# Patient Record
Sex: FEMALE | Race: WHITE | NOT HISPANIC OR LATINO | URBAN - METROPOLITAN AREA
[De-identification: names, ages, dates, MRNs, and addresses within clinical notes are randomized per-mention and may not be internally consistent; named-entity substitution may affect disease eponyms.]

---

## 2018-03-01 ENCOUNTER — OUTPATIENT (OUTPATIENT)
Dept: OUTPATIENT SERVICES | Facility: HOSPITAL | Age: 18
LOS: 1 days | Discharge: HOME | End: 2018-03-01

## 2018-03-01 PROBLEM — Z00.00 ENCOUNTER FOR PREVENTIVE HEALTH EXAMINATION: Status: ACTIVE | Noted: 2018-03-01

## 2018-03-02 DIAGNOSIS — F43.21 ADJUSTMENT DISORDER WITH DEPRESSED MOOD: ICD-10-CM

## 2018-05-24 ENCOUNTER — EMERGENCY (EMERGENCY)
Facility: HOSPITAL | Age: 18
LOS: 1 days | Discharge: HOME | End: 2018-05-24
Attending: EMERGENCY MEDICINE | Admitting: EMERGENCY MEDICINE
Payer: MEDICAID

## 2018-05-24 VITALS
TEMPERATURE: 98 F | HEART RATE: 73 BPM | OXYGEN SATURATION: 100 % | DIASTOLIC BLOOD PRESSURE: 58 MMHG | SYSTOLIC BLOOD PRESSURE: 124 MMHG | RESPIRATION RATE: 16 BRPM

## 2018-05-24 PROCEDURE — 99283 EMERGENCY DEPT VISIT LOW MDM: CPT

## 2018-05-24 PROCEDURE — 73130 X-RAY EXAM OF HAND: CPT | Mod: 26,LT

## 2018-05-24 NOTE — ED PROVIDER NOTE - OBJECTIVE STATEMENT
18 y/o F w/ no significant PMhx, presents tot he ED c/o left thumb pain s/p trip and fall last night. Pt states she was walking, tripped and fell on outstretched left hand. Pt notes she felt her thumb bend backwards during the fall. Denies LOC, head trauma, numbness/tingling or any other complaints. NKDA.

## 2019-11-15 ENCOUNTER — OUTPATIENT (OUTPATIENT)
Dept: OUTPATIENT SERVICES | Facility: HOSPITAL | Age: 19
LOS: 1 days | Discharge: HOME | End: 2019-11-15

## 2019-11-18 DIAGNOSIS — F43.20 ADJUSTMENT DISORDER, UNSPECIFIED: ICD-10-CM

## 2020-08-19 ENCOUNTER — APPOINTMENT (OUTPATIENT)
Dept: CARDIOLOGY | Facility: CLINIC | Age: 20
End: 2020-08-19
Payer: MEDICAID

## 2020-08-19 VITALS
HEIGHT: 69 IN | SYSTOLIC BLOOD PRESSURE: 114 MMHG | BODY MASS INDEX: 25.77 KG/M2 | DIASTOLIC BLOOD PRESSURE: 66 MMHG | WEIGHT: 174 LBS | HEART RATE: 91 BPM | OXYGEN SATURATION: 98 %

## 2020-08-19 DIAGNOSIS — I87.2 VENOUS INSUFFICIENCY (CHRONIC) (PERIPHERAL): ICD-10-CM

## 2020-08-19 PROCEDURE — 99204 OFFICE O/P NEW MOD 45 MIN: CPT

## 2020-08-19 NOTE — ASSESSMENT
[FreeTextEntry1] : Assessment:\par 1.  Varicose veins\par 2.  Deep venous reflux based on duplex\par \par \par Plan:\par 1.  Repeat venous reflux study\par 2.  Will perform MR venogram to assess for central venous compression \par 3.  Transition to knee high 20-30 mmhg compression garments\par 4.  She should talk to her OB/GYN to seek an alternative to an estrogen containing OCP (she may be at higher risk fo VTE events)\par 5.  Await studies, then telephone conference afterwards\par

## 2020-08-19 NOTE — PHYSICAL EXAM
[Well Groomed] : well groomed [Normal Appearance] : normal appearance [General Appearance - Well Developed] : well developed [No Deformities] : no deformities [General Appearance - Well Nourished] : well nourished [General Appearance - In No Acute Distress] : no acute distress [Normal Conjunctiva] : the conjunctiva exhibited no abnormalities [Eyelids - No Xanthelasma] : the eyelids demonstrated no xanthelasmas [Normal Jugular Venous V Waves Present] : normal jugular venous V waves present [Normal Jugular Venous A Waves Present] : normal jugular venous A waves present [Heart Sounds] : normal S1 and S2 [No Jugular Venous Valdez A Waves] : no jugular venous valdez A waves [Heart Rate And Rhythm] : heart rate and rhythm were normal [Murmurs] : no murmurs present [Abdomen Soft] : soft [Abdomen Mass (___ Cm)] : no abdominal mass palpated [Abdomen Tenderness] : non-tender [] : no hepato-splenomegaly [Gait - Sufficient For Exercise Testing] : the gait was sufficient for exercise testing [Abnormal Walk] : normal gait [FreeTextEntry1] : Strong pedal pulses.  R calf VV, compressible, soft.  No edema.  No hyperpigmentation.

## 2020-08-19 NOTE — REASON FOR VISIT
[Initial Evaluation] : an initial evaluation of [FreeTextEntry2] : VV [FreeTextEntry1] : Here for second opinion \par Was seen at [facility] - \par Had reflux study \par was told she has Deep venous reflux (based on duplex)\par No MR venogram was performed\par Reccomended bilateral stenting \par \par She has a small, right calf VV on exam.  The vein is painful and worse with prolonged standing.  She has 15-20 Mmhg comression garments pantyhose that she wears when is not hot\par \par Went to [place]\par Now works as a  standing long hours - worse with prolonged standing\par The left leg is ok\par \par Joined with her mother\par She is on Blisovife - an estrogen containing OCP\par \par Maternal Aunt and Maternal GM with VV\par She feels like the vein is going to burst sometimes.  No prior vascular procedures.  No claudication.  Symptoms for the past 2 years.

## 2020-08-26 ENCOUNTER — APPOINTMENT (OUTPATIENT)
Dept: VASCULAR SURGERY | Facility: CLINIC | Age: 20
End: 2020-08-26
Payer: MEDICAID

## 2020-08-26 VITALS — TEMPERATURE: 97.5 F

## 2020-08-26 PROCEDURE — 93970 EXTREMITY STUDY: CPT

## 2020-09-11 ENCOUNTER — TRANSCRIPTION ENCOUNTER (OUTPATIENT)
Age: 20
End: 2020-09-11

## 2020-09-30 ENCOUNTER — APPOINTMENT (OUTPATIENT)
Dept: CARDIOLOGY | Facility: CLINIC | Age: 20
End: 2020-09-30
Payer: MEDICAID

## 2020-09-30 VITALS
TEMPERATURE: 98.7 F | HEIGHT: 69 IN | OXYGEN SATURATION: 98 % | HEART RATE: 80 BPM | SYSTOLIC BLOOD PRESSURE: 112 MMHG | BODY MASS INDEX: 24.92 KG/M2 | DIASTOLIC BLOOD PRESSURE: 69 MMHG | WEIGHT: 168.25 LBS

## 2020-09-30 PROCEDURE — 99213 OFFICE O/P EST LOW 20 MIN: CPT

## 2020-09-30 NOTE — ASSESSMENT
[FreeTextEntry1] : Assessment:\par 1.  Varicose veins\par 2.  R GSV reflux\par \par \par Plan:\par 1. Continue with 20-30 knee high compression\par 2.  Referral to Dr. Muir for EVLT of the R GSV

## 2020-09-30 NOTE — PHYSICAL EXAM
[General Appearance - Well Developed] : well developed [Normal Appearance] : normal appearance [Well Groomed] : well groomed [General Appearance - Well Nourished] : well nourished [No Deformities] : no deformities [General Appearance - In No Acute Distress] : no acute distress [Normal Conjunctiva] : the conjunctiva exhibited no abnormalities [Eyelids - No Xanthelasma] : the eyelids demonstrated no xanthelasmas [Normal Jugular Venous A Waves Present] : normal jugular venous A waves present [Normal Jugular Venous V Waves Present] : normal jugular venous V waves present [No Jugular Venous Valdez A Waves] : no jugular venous valdez A waves [Heart Rate And Rhythm] : heart rate and rhythm were normal [Heart Sounds] : normal S1 and S2 [Murmurs] : no murmurs present [Abdomen Soft] : soft [Abdomen Tenderness] : non-tender [] : no hepato-splenomegaly [Abdomen Mass (___ Cm)] : no abdominal mass palpated [Abnormal Walk] : normal gait [Gait - Sufficient For Exercise Testing] : the gait was sufficient for exercise testing [FreeTextEntry1] : Strong pedal pulses.  R calf VV, compressible, soft.  No edema.  No hyperpigmentation.

## 2020-09-30 NOTE — REASON FOR VISIT
[Follow-Up - Clinic] : a clinic follow-up of [FreeTextEntry2] : VV [FreeTextEntry1] :  No may thurners on reflux study \par still having symptoms\par has 20-30 mmHg compression garments\par pain and aching with prolonged standing\par still on esttrogen OCP, will switch next month\par

## 2020-12-02 ENCOUNTER — APPOINTMENT (OUTPATIENT)
Dept: VASCULAR SURGERY | Facility: CLINIC | Age: 20
End: 2020-12-02
Payer: MEDICAID

## 2020-12-02 VITALS
DIASTOLIC BLOOD PRESSURE: 58 MMHG | BODY MASS INDEX: 24.25 KG/M2 | SYSTOLIC BLOOD PRESSURE: 120 MMHG | HEIGHT: 68 IN | HEART RATE: 71 BPM | WEIGHT: 160 LBS

## 2020-12-02 VITALS — TEMPERATURE: 95.9 F

## 2020-12-02 DIAGNOSIS — Z78.9 OTHER SPECIFIED HEALTH STATUS: ICD-10-CM

## 2020-12-02 PROCEDURE — 99072 ADDL SUPL MATRL&STAF TM PHE: CPT

## 2020-12-02 PROCEDURE — 99202 OFFICE O/P NEW SF 15 MIN: CPT

## 2020-12-02 RX ORDER — NORETHINDRONE ACETATE AND ETHINYL ESTRADIOL 1.5-30(21)
KIT ORAL
Refills: 0 | Status: ACTIVE | COMMUNITY

## 2020-12-02 NOTE — PHYSICAL EXAM
[2+] : left 2+ [Varicose Veins Of Lower Extremities] : present [Varicose Veins Of The Right Leg] : of the right leg [Ankle Swelling On The Left] : moderate [No Rash or Lesion] : No rash or lesion [Alert] : alert [Calm] : calm [JVD] : no jugular venous distention  [Ankle Swelling (On Exam)] : not present [] : not present [Skin Ulcer] : no ulcer [de-identified] : appears well  [de-identified] : motor intact b/l lower extremities, muscle strength 5/5 with dorsi and plantar flexion\par  [de-identified] : sensation intact b/l lower extremities

## 2020-12-02 NOTE — CONSULT LETTER
[Dear  ___] : Dear  [unfilled], [Consult Letter:] : I had the pleasure of evaluating your patient, [unfilled]. [Please see my note below.] : Please see my note below. [Sincerely,] : Sincerely, [FreeTextEntry3] : Patti Reddy PA-C\par [facility]\par

## 2020-12-02 NOTE — HISTORY OF PRESENT ILLNESS
[FreeTextEntry1] : 19 yo female with a history of varicose veins of the right lower extremity presents for evaluation of painful varicose veins.  pt states that the veins have been present for many years but they have become larger and more painful over time.  pt works as a  and has been wearing compression stockings without any relief.  pt rates the pain 8/10 and described as a gnawing pain.  pt states that she tries to message her leg without any relief.  the symptoms are interfering with her daily life and slowing her down at work.

## 2020-12-02 NOTE — ASSESSMENT
[FreeTextEntry1] : 21 yo female with a history of varicose veins of the right lower extremity presents for evaluation of painful varicose veins\par \par duplex reviewed and shows insufficiency in the right gsv \par given pain without any relief with compression stockings will arrange for rf ablation of the right lower extremity \par

## 2020-12-15 ENCOUNTER — APPOINTMENT (OUTPATIENT)
Dept: VASCULAR SURGERY | Facility: CLINIC | Age: 20
End: 2020-12-15
Payer: MEDICAID

## 2020-12-15 VITALS — TEMPERATURE: 96.8 F

## 2020-12-15 PROCEDURE — 99213 OFFICE O/P EST LOW 20 MIN: CPT

## 2020-12-15 PROCEDURE — 99072 ADDL SUPL MATRL&STAF TM PHE: CPT

## 2020-12-15 NOTE — PHYSICAL EXAM
[Varicose Veins Of Lower Extremities] : present [Varicose Veins Of The Right Leg] : of the right leg [Ankle Swelling On The Left] : moderate [No Rash or Lesion] : No rash or lesion [Alert] : alert [Calm] : calm [JVD] : no jugular venous distention  [Ankle Swelling (On Exam)] : not present [] : not present [Skin Ulcer] : no ulcer [de-identified] : appears well  [de-identified] : \par  [de-identified] : sensation intact b/l lower extremities

## 2020-12-15 NOTE — ASSESSMENT
[FreeTextEntry1] : 19 yo female with a history of varicose veins of the right lower extremity presents for evaluation of painful varicose veins\par \par duplex reviewed and shows insufficiency in the right gsv \par given pain without any relief with compression stockings will arrange for rf ablation of the right lower extremity

## 2020-12-15 NOTE — HISTORY OF PRESENT ILLNESS
[FreeTextEntry1] : 19 yo female with a history of varicose veins of the right lower extremity presents for follow up of painful varicose veins.  pt states that the veins have been present for many years but they have become larger and more painful over time.  pt works as a  and has been wearing compression stockings without any relief.  pt rates the pain 8/10 and described as a gnawing pain.  pt states that she tries to message her leg without any relief.  the symptoms are interfering with her daily life and slowing her down at work.

## 2021-02-23 ENCOUNTER — RESULT REVIEW (OUTPATIENT)
Age: 21
End: 2021-02-23

## 2021-03-27 ENCOUNTER — APPOINTMENT (OUTPATIENT)
Dept: DISASTER EMERGENCY | Facility: CLINIC | Age: 21
End: 2021-03-27

## 2021-03-27 DIAGNOSIS — Z01.818 ENCOUNTER FOR OTHER PREPROCEDURAL EXAMINATION: ICD-10-CM

## 2021-03-27 LAB — SARS-COV-2 N GENE NPH QL NAA+PROBE: NOT DETECTED

## 2021-03-29 PROBLEM — I83.90 VARICOSE VEINS: Status: ACTIVE | Noted: 2020-08-19

## 2021-03-30 ENCOUNTER — LABORATORY RESULT (OUTPATIENT)
Age: 21
End: 2021-03-30

## 2021-03-30 ENCOUNTER — APPOINTMENT (OUTPATIENT)
Dept: ENDOVASCULAR SURGERY | Facility: CLINIC | Age: 21
End: 2021-03-30
Payer: MEDICAID

## 2021-03-30 VITALS
TEMPERATURE: 97.8 F | RESPIRATION RATE: 16 BRPM | OXYGEN SATURATION: 100 % | HEART RATE: 62 BPM | DIASTOLIC BLOOD PRESSURE: 56 MMHG | BODY MASS INDEX: 24.25 KG/M2 | SYSTOLIC BLOOD PRESSURE: 109 MMHG | HEIGHT: 68 IN | WEIGHT: 160 LBS

## 2021-03-30 DIAGNOSIS — I83.90 ASYMPTOMATIC VARICOSE VEINS OF UNSPECIFIED LOWER EXTREMITY: ICD-10-CM

## 2021-03-30 PROCEDURE — 37765 STAB PHLEB VEINS XTR 10-20: CPT | Mod: RT

## 2021-03-30 PROCEDURE — 36475 ENDOVENOUS RF 1ST VEIN: CPT | Mod: RT

## 2021-03-30 PROCEDURE — 99072 ADDL SUPL MATRL&STAF TM PHE: CPT

## 2021-03-30 NOTE — PROCEDURE
[D/C IV on discharge] : D/C IV on discharge [Resume diet] : resume diet [Dressing checked for bleeding] : Dressing checked for bleeding [Vital signs on admission the q 15 mins x2] : Vital signs on admission the q 15 mins x2 [FreeTextEntry1] : right leg radiofrequency ablation/phlebectomy

## 2021-03-30 NOTE — ASSESSMENT
[FreeTextEntry1] : duplex reviewed and shows insufficiency in the right gsv \par given pain without any relief with compression stockings plan for rf ablation of the right lower extremity

## 2021-03-30 NOTE — PAST MEDICAL HISTORY
[Increasing age ( >40 years old)] : Increasing age ( >40 years old) [No therapy indicated for cases scheduled for less than one hour] : No therapy indicated for cases scheduled for less than one hour. [Varicose Veins] : Varicose Veins [FreeTextEntry1] : Malignant Hyperthermia Screening Tool and Risk of Bleeding Assessment \par \par Ms. ELOISE MORALES denies family of unexpected death following Anesthesia or Exercise.\par Denies Family history of Malignant Hyperthermia, Muscle or Neuromuscular disorder and High Temperature  following exercise.\par \par Ms. ELOISE MORALES denies history of Muscle Spasm, Dark or Chocolate- Colored and Unanticipated fever immediately following anaesthesia or serious exercise.\par Ms. ELOISE MORALES also denies bleeding tendencies/Risks of Bleeding.\par

## 2021-03-30 NOTE — HISTORY OF PRESENT ILLNESS
[FreeTextEntry1] : alert and oriented x 3 \par feels ok\par accompanied by her mother Assumpta [phone]\par  no reported falls \par covid test 3/27/2021 negative\par HCG negative  [FreeTextEntry5] : yesterday at 10pm [FreeTextEntry6] : Dr Krsue

## 2021-04-02 ENCOUNTER — APPOINTMENT (OUTPATIENT)
Dept: VASCULAR SURGERY | Facility: CLINIC | Age: 21
End: 2021-04-02
Payer: MEDICAID

## 2021-04-02 PROCEDURE — 99072 ADDL SUPL MATRL&STAF TM PHE: CPT

## 2021-04-02 PROCEDURE — 93971 EXTREMITY STUDY: CPT

## 2022-12-27 NOTE — ED PROVIDER NOTE - NS HIV RISK FACTOR YES
Declined
replace loss with gatorade/coconut water/pedialyte/soups, slowly advance diet (bread/wheat/toast/soft diet). symptoms should last 24-48 hrs. can use imodium 2mg as long as no blood in stool or fever. see your MD.